# Patient Record
Sex: FEMALE | Race: WHITE | NOT HISPANIC OR LATINO | URBAN - NONMETROPOLITAN AREA
[De-identification: names, ages, dates, MRNs, and addresses within clinical notes are randomized per-mention and may not be internally consistent; named-entity substitution may affect disease eponyms.]

---

## 2017-04-27 PROBLEM — H25.13: Noted: 2017-04-27

## 2017-04-27 PROBLEM — H52.13: Noted: 2017-04-27

## 2017-04-27 PROBLEM — H52.4: Noted: 2017-04-27

## 2019-09-11 ENCOUNTER — IMPORTED ENCOUNTER (OUTPATIENT)
Dept: URBAN - NONMETROPOLITAN AREA CLINIC 1 | Facility: CLINIC | Age: 74
End: 2019-09-11

## 2019-09-11 PROCEDURE — 92014 COMPRE OPH EXAM EST PT 1/>: CPT

## 2019-09-11 NOTE — PATIENT DISCUSSION
"""Cataract(s) MODERATE PROGRESSION WIITH INCREASED SYMPTOMS-Visually significant.-Cataract(s) causing symptomatic impairment of visual function not correctable with a tolerable change in glasses or contact lenses lighting or non-operative means resulting in specific activity limitations and/or participation restrictions including but not limited to reading viewing television driving or meeting vocational or recreational needs. -Expectation is clearer vision and reduced glare disability after cataract removal.-Refer to Dr Enrique Solitario for cataract evaluationPT TO CALL WHEN READYMyopia-Discussed diagnosis with patient. -Explained that people who are myopic are at a higher risk for developing RD/RT and reviewed associated S&S.-Pt to contact our office if symptoms develop. Presbyopia-Discussed diagnosis with patient. Updated spec Rx given. Recommend lens that will provide comfort as well as protect safety and health of eyes. "

## 2020-03-13 ENCOUNTER — IMPORTED ENCOUNTER (OUTPATIENT)
Dept: URBAN - NONMETROPOLITAN AREA CLINIC 1 | Facility: CLINIC | Age: 75
End: 2020-03-13

## 2020-03-13 PROBLEM — H25.813: Noted: 2020-03-13

## 2020-03-13 PROBLEM — H35.3131: Noted: 2020-03-13

## 2020-03-13 PROCEDURE — 92014 COMPRE OPH EXAM EST PT 1/>: CPT

## 2020-03-13 NOTE — PATIENT DISCUSSION
Cataract(s)-Visually significant cataract OU .-Cataract(s) causing symptomatic impairment of visual function not correctable with a tolerable change in glasses or contact lenses lighting or non-operative means resulting in specific activity limitations and/or participation restrictions including but not limited to reading viewing television driving or meeting vocational or recreational needs. -Expectation is clearer vision and functional improvement in symptoms as well as reduced glare disability after cataract removal.-Order IOLMaster and OPD today. -Recommend Toric/Trad sx OU  based on today's OPD testing and lifestyle questionnaire.-All questions were answered regarding surgery including pre and post-op medications appointments activity restrictions and anesthetic usage.-The risks benefits and alternatives and special risk factors for the patient were discussed in detail including but not limited to: bleeding infection retinal detachment vitreous loss problems with the implant and possible need for additional surgery.-Although rare the possibility of complete vision loss was discussed.-The possible need for glasses post-operatively was discussed.-Order medical clearance exam.-Patient elects to proceed with cataract surgery OS . Will schedule at patient's convenience and re-evaluate OD  in the future. *Start Lotemax BID and AT's tid x 1 week ****1 week repeat topos**AMD - dry-Explained dry AMD and advised that there are currently no treatments available. -Recommend pt begin AREDS 2 MVT and monitoring Amsler grid.-Amsler grid given and explained to pt. Recommend monitoring daily. Pt is to contact our office if any changes are noted.

## 2020-12-11 ENCOUNTER — IMPORTED ENCOUNTER (OUTPATIENT)
Dept: URBAN - NONMETROPOLITAN AREA CLINIC 1 | Facility: CLINIC | Age: 75
End: 2020-12-11

## 2020-12-11 PROCEDURE — 92014 COMPRE OPH EXAM EST PT 1/>: CPT

## 2020-12-11 NOTE — PATIENT DISCUSSION
Cataract(s)-Visually significant cataract OU .-Cataract(s) causing symptomatic impairment of visual function not correctable with a tolerable change in glasses or contact lenses lighting or non-operative means resulting in specific activity limitations and/or participation restrictions including but not limited to reading viewing television driving or meeting vocational or recreational needs. -Expectation is clearer vision and functional improvement in symptoms as well as reduced glare disability after cataract removal.-Order IOLMaster and OPD today. -Recommend LRI OD and TORIC OS based on today's OPD testing and lifestyle questionnaire.-All questions were answered regarding surgery including pre and post-op medications appointments activity restrictions and anesthetic usage.-The risks benefits and alternatives and special risk factors for the patient were discussed in detail including but not limited to: bleeding infection retinal detachment vitreous loss problems with the implant and possible need for additional surgery.-Although rare the possibility of complete vision loss was discussed.-The possible need for glasses post-operatively was discussed.-Order medical clearance exam.-Patient elects to proceed with cataract surgery OS . Will schedule at patient's convenience and re-evaluate OD  in the future. AMD - dry-Explained dry AMD and advised that there are currently no treatments available. -Recommend pt begin AREDS 2 MVT and monitoring Amsler grid.-Amsler grid given and explained to pt. Recommend monitoring daily. Pt is to contact our office if any changes are noted. Discussed all lens options in detail with patient. Discussed LRI OD and Toric OS and benefits and she elects to proceed with this option. Discussed the fact she will need otc readers or mild rx for reading and she expressed understnading. Post op inflammation anticipated discussed Dextenza insertion after surgery.

## 2020-12-30 PROBLEM — H25.813: Noted: 2020-12-30

## 2020-12-30 PROBLEM — H35.3131: Noted: 2020-12-30

## 2021-01-06 ENCOUNTER — IMPORTED ENCOUNTER (OUTPATIENT)
Dept: URBAN - NONMETROPOLITAN AREA CLINIC 1 | Facility: CLINIC | Age: 76
End: 2021-01-06

## 2021-01-06 PROBLEM — E78.5: Noted: 2021-01-06

## 2021-01-06 PROBLEM — I10: Noted: 2021-01-06

## 2021-01-06 PROBLEM — Z01.818: Noted: 2021-01-06

## 2021-01-06 PROBLEM — H25.813: Noted: 2021-01-06

## 2021-01-12 ENCOUNTER — IMPORTED ENCOUNTER (OUTPATIENT)
Dept: URBAN - NONMETROPOLITAN AREA CLINIC 1 | Facility: CLINIC | Age: 76
End: 2021-01-12

## 2021-01-12 PROBLEM — Z98.42: Noted: 2021-01-12

## 2021-01-12 PROCEDURE — 99024 POSTOP FOLLOW-UP VISIT: CPT

## 2021-01-12 NOTE — PATIENT DISCUSSION
s/p PCIOL-Pt doing well s/p PCIOL. -Continue post-op gtts according to instruction sheet and sleep with eye shield over eye for 7 nights.-Avoid bending at the waist lifting anything over 5lbs and dirty or irina environments. -RTC .

## 2021-01-20 ENCOUNTER — IMPORTED ENCOUNTER (OUTPATIENT)
Dept: URBAN - NONMETROPOLITAN AREA CLINIC 1 | Facility: CLINIC | Age: 76
End: 2021-01-20

## 2021-01-20 PROBLEM — H25.811: Noted: 2021-01-20

## 2021-01-20 PROBLEM — I10: Noted: 2021-01-20

## 2021-01-20 PROBLEM — Z01.818: Noted: 2021-01-20

## 2021-01-20 PROBLEM — E78.5: Noted: 2021-01-20

## 2021-01-20 PROBLEM — Z98.42: Noted: 2021-01-12

## 2021-01-20 PROCEDURE — 99024 POSTOP FOLLOW-UP VISIT: CPT

## 2021-01-20 NOTE — PATIENT DISCUSSION
Cataract(s)-Visually significant cataract OD . -Cataract(s) causing symptomatic impairment of visual function not correctable with a tolerable change in glasses or contact lenses lighting or non-operative means resulting in specific activity limitations and/or participation restrictions including but not limited to reading viewing television driving or meeting vocational or recreational needs. -Expectation is clearer vision and functional improvement in symptoms as well as reduced glare disability after cataract removal.-Recommend Trad/Limbal Relaxing Incisions based on previous OPD testing and lifestyle questionnaire.-All questions were answered regarding surgery including pre and post-op medications appointments activity restrictions and anesthetic usage.-The risks benefits and alternatives and special risk factors for the patient were discussed in detail including but not limited to: bleeding infection retinal detachment vitreous loss problems with the implant and possible need for additional surgery.-Although rare the possibility of complete vision loss was discussed.-The need for glasses post-operatively was discussed.-Patient elects to proceed with cataract surgery OD . s/p PCIOL-Pt doing well at 1 week s/p PCIOL. -Continue post-op gtts according to instruction sheet.-Okay to resume usual activites and d/c eye shield.

## 2021-01-29 ENCOUNTER — IMPORTED ENCOUNTER (OUTPATIENT)
Dept: URBAN - NONMETROPOLITAN AREA CLINIC 1 | Facility: CLINIC | Age: 76
End: 2021-01-29

## 2021-01-29 PROBLEM — Z98.41: Noted: 2021-01-29

## 2021-01-29 PROCEDURE — 99024 POSTOP FOLLOW-UP VISIT: CPT

## 2021-02-04 ENCOUNTER — IMPORTED ENCOUNTER (OUTPATIENT)
Dept: URBAN - NONMETROPOLITAN AREA CLINIC 1 | Facility: CLINIC | Age: 76
End: 2021-02-04

## 2021-02-04 PROCEDURE — 99024 POSTOP FOLLOW-UP VISIT: CPT

## 2021-05-13 ENCOUNTER — IMPORTED ENCOUNTER (OUTPATIENT)
Dept: URBAN - NONMETROPOLITAN AREA CLINIC 1 | Facility: CLINIC | Age: 76
End: 2021-05-13

## 2021-05-13 PROBLEM — Z96.1: Noted: 2021-05-13

## 2021-05-13 PROCEDURE — 92014 COMPRE OPH EXAM EST PT 1/>: CPT

## 2022-04-10 ASSESSMENT — VISUAL ACUITY
OD_SC: 20/20-2
OD_CC: J1
OS_SC: 20/30-
OS_CC: J1
OS_SC: 20/40
OD_CC: 20/25
OS_CC: J2
OS_PH: 20/40
OS_CC: 20/40-1 BLURRY
OD_GLARE: 20/70
OD_CC: 20/40
OD_PAM: 20/25
OD_PAM: 20/20-2
OD_GLARE: 20/50
OS_CC: 20/400
OS_CC: 20/50
OU_CC: J1
OS_AM: 20/25
OD_CC: J2
OS_CC: 20/80
OS_SC: 20/50
OS_SC: 20/30
OD_SC: 20/30
OS_CC: 20/25-2 BLURRY
OD_PAM: 20/25
OD_SC: 20/25
OD_CC: 20/25
OD_GLARE: 20/60
OS_CC: 20/30
OS_AM: 20/25

## 2022-04-10 ASSESSMENT — TONOMETRY
OD_IOP_MMHG: 11
OD_IOP_MMHG: 11
OS_IOP_MMHG: 15
OS_IOP_MMHG: 11
OS_IOP_MMHG: 11
OD_IOP_MMHG: 19
OS_IOP_MMHG: 16
OD_IOP_MMHG: 13
OD_IOP_MMHG: 16
OS_IOP_MMHG: 13
OS_IOP_MMHG: 13
OD_IOP_MMHG: 16
OD_IOP_MMHG: 13
OS_IOP_MMHG: 17

## 2022-05-17 ENCOUNTER — FOLLOW UP (OUTPATIENT)
Dept: RURAL CLINIC 1 | Facility: CLINIC | Age: 77
End: 2022-05-17

## 2022-05-17 DIAGNOSIS — Z96.1: ICD-10-CM

## 2022-05-17 PROCEDURE — 92014 COMPRE OPH EXAM EST PT 1/>: CPT

## 2022-05-17 ASSESSMENT — VISUAL ACUITY
OS_SC: 20/40+2
OS_PH: 20/40
OD_SC: 20/25

## 2022-05-17 ASSESSMENT — TONOMETRY
OD_IOP_MMHG: 15
OS_IOP_MMHG: 15

## 2023-05-23 ENCOUNTER — FOLLOW UP (OUTPATIENT)
Dept: RURAL CLINIC 1 | Facility: CLINIC | Age: 78
End: 2023-05-23

## 2023-05-23 DIAGNOSIS — Z96.1: ICD-10-CM

## 2023-05-23 PROCEDURE — 92014 COMPRE OPH EXAM EST PT 1/>: CPT

## 2023-05-23 ASSESSMENT — TONOMETRY
OS_IOP_MMHG: 17
OD_IOP_MMHG: 17

## 2023-05-23 ASSESSMENT — VISUAL ACUITY
OD_SC: 20/25+1
OS_SC: 20/25-2

## 2024-07-23 ENCOUNTER — COMPREHENSIVE EXAM (OUTPATIENT)
Dept: RURAL CLINIC 1 | Facility: CLINIC | Age: 79
End: 2024-07-23

## 2024-07-23 DIAGNOSIS — Z96.1: ICD-10-CM

## 2024-07-23 DIAGNOSIS — H26.493: ICD-10-CM

## 2024-07-23 PROCEDURE — 92014 COMPRE OPH EXAM EST PT 1/>: CPT

## 2024-07-23 ASSESSMENT — VISUAL ACUITY
OS_SC: 20/40
OD_SC: 20/50

## 2024-07-23 ASSESSMENT — TONOMETRY
OS_IOP_MMHG: 14
OD_IOP_MMHG: 14

## 2025-07-30 ENCOUNTER — COMPREHENSIVE EXAM (OUTPATIENT)
Age: 80
End: 2025-07-30

## 2025-07-30 DIAGNOSIS — H26.493: ICD-10-CM

## 2025-07-30 DIAGNOSIS — Z96.1: ICD-10-CM

## 2025-07-30 DIAGNOSIS — H35.3132: ICD-10-CM

## 2025-07-30 PROCEDURE — 92014 COMPRE OPH EXAM EST PT 1/>: CPT
